# Patient Record
Sex: FEMALE | Race: WHITE | NOT HISPANIC OR LATINO | URBAN - METROPOLITAN AREA
[De-identification: names, ages, dates, MRNs, and addresses within clinical notes are randomized per-mention and may not be internally consistent; named-entity substitution may affect disease eponyms.]

---

## 2022-10-19 ENCOUNTER — OFFICE (OUTPATIENT)
Dept: URBAN - METROPOLITAN AREA CLINIC 19 | Facility: CLINIC | Age: 52
End: 2022-10-19

## 2022-10-19 VITALS
HEIGHT: 62 IN | HEART RATE: 95 BPM | WEIGHT: 179.2 LBS | OXYGEN SATURATION: 98 % | DIASTOLIC BLOOD PRESSURE: 79 MMHG | SYSTOLIC BLOOD PRESSURE: 132 MMHG

## 2022-10-19 DIAGNOSIS — K59.00 CONSTIPATION, UNSPECIFIED: ICD-10-CM

## 2022-10-19 DIAGNOSIS — K21.9 GASTRO-ESOPHAGEAL REFLUX DISEASE WITHOUT ESOPHAGITIS: ICD-10-CM

## 2022-10-19 DIAGNOSIS — E88.81 METABOLIC SYNDROME AND OTHER INSULIN RESISTANCE: ICD-10-CM

## 2022-10-19 DIAGNOSIS — R73.09 OTHER ABNORMAL GLUCOSE: ICD-10-CM

## 2022-10-19 LAB
AMYLASE: 38 U/L (ref 31–110)
HEMOGLOBIN A1C: 5.6 % (ref 4.8–5.6)
LIPASE: 33 U/L (ref 14–72)
LIPID PANEL: CHOLESTEROL, TOTAL: 256 MG/DL — HIGH (ref 100–199)
LIPID PANEL: COMMENT: (no result)
LIPID PANEL: HDL CHOLESTEROL: 82 MG/DL (ref 39–?)
LIPID PANEL: LDL CHOL CALC (NIH): 153 MG/DL — HIGH (ref 0–99)
LIPID PANEL: TRIGLYCERIDES: 123 MG/DL (ref 0–149)
LIPID PANEL: VLDL CHOLESTEROL CAL: 21 MG/DL (ref 5–40)

## 2022-10-19 PROCEDURE — 99204 OFFICE O/P NEW MOD 45 MIN: CPT

## 2022-10-19 RX ORDER — TIRZEPATIDE 2.5 MG/.5ML
INJECTION, SOLUTION SUBCUTANEOUS
Qty: 4 | Refills: 6 | Status: COMPLETED
Start: 2022-10-19 | End: 2023-04-21

## 2022-10-19 RX ORDER — FAMOTIDINE 20 MG/1
TABLET, FILM COATED ORAL
Qty: 60 | Refills: 3 | Status: COMPLETED
Start: 2022-10-19 | End: 2023-04-21

## 2022-10-19 RX ORDER — PROMETHAZINE HYDROCHLORIDE 25 MG/1
TABLET ORAL
Qty: 30 | Refills: 3 | Status: ACTIVE
Start: 2022-10-19

## 2022-10-19 NOTE — SERVICEHPINOTES
Ms. Ren is a 52 year old female here with an elevated A1c, weight gain, GERD, and metabolic syndrome. The patient reports that she has gained weight over the last several years and has been unsuccessful with any diet or exercise program. She is also recently told she had an elevated A1c and placed on metformin. The patient also reports that she does have intermittent GERD for which she takes over-the-counter products. She also reports she does have chronic constipation. She reports that she will go several days with at a bowel movement with associated abdominal discomfort. She denies any melena or hematochezia. She reports that she does exercise on the weekends. She is interested in trying a GLP1. She is currently on stimulants for ADD. She denies a personal history of medullary thyroid carcinoma, pancreatitis, or kidney disease.

## 2022-11-23 ENCOUNTER — TELEHEALTH PROVIDED OTHER THAN IN PATIENT'S HOME (OUTPATIENT)
Dept: URBAN - METROPOLITAN AREA CLINIC 19 | Facility: CLINIC | Age: 52
End: 2022-11-23

## 2022-11-23 VITALS — HEIGHT: 62 IN

## 2022-11-23 DIAGNOSIS — K21.9 GASTRO-ESOPHAGEAL REFLUX DISEASE WITHOUT ESOPHAGITIS: ICD-10-CM

## 2022-11-23 DIAGNOSIS — R14.2 ERUCTATION: ICD-10-CM

## 2022-11-23 DIAGNOSIS — R19.7 DIARRHEA, UNSPECIFIED: ICD-10-CM

## 2022-11-23 DIAGNOSIS — R74.01 ELEVATION OF LEVELS OF LIVER TRANSAMINASE LEVELS: ICD-10-CM

## 2022-11-23 DIAGNOSIS — R10.32 LEFT LOWER QUADRANT PAIN: ICD-10-CM

## 2022-11-23 DIAGNOSIS — K76.0 FATTY (CHANGE OF) LIVER, NOT ELSEWHERE CLASSIFIED: ICD-10-CM

## 2022-11-23 RX ORDER — SIMETHICONE 180 MG
CAPSULE ORAL
Qty: 30 | Refills: 3 | Status: COMPLETED
Start: 2022-11-23 | End: 2023-01-25

## 2022-11-23 NOTE — SERVICENOTES
The duration of the telehealth visit was 7 minutes and 56 seconds. discussed the need to follow-up possible IPMN and to have a fibroscan. Will hold off on GlP1 for now.

## 2022-11-23 NOTE — SERVICEHPINOTES
Ms. Ren is a 52-year-old female here with GERD, recent diarrhea and left lower quadrant pain, elevated liver enzymes, and fatty liver. The patient started Mounjaro in October of this year for metabolic syndrome. She reports that she took 3 doses. She states that she began having diarrhea as well as left lower quadrant pain well after starting the medication.. Patient was seen at an urgent care where she did have a CT scan performed that did revealed suspected colitis of the sigmoid colon. She was placed on Flagyl and Cipro. She did call the office and stated that she was having continued symptoms after antibiotics. An additional prescription of Cipro was called in for the patient. She reports that since finishing that regimen her diarrhea and left lower quadrant pain have dissipated. The CT performed November 1st at urgent care did also reveal patient had fatty liver disease. A CT performed in 2015 also revealed liver steatosis as well as a stable cystic lesion involving the proximal pancreatic body measuring approximately 7 millimeters. This was thought to be a dilated pancreatic duct side branch or a possible side-branch IPMN. This was not appreciated on the most recent CT although no contrast was used. She denies any melena, hematochezia, or vomiting. She states that she is having increased reflux as well as belching, particularly when lying down.

## 2022-11-29 LAB
HEPATIC FUNCTION PANEL (7): ALBUMIN: 5.1 G/DL — HIGH (ref 3.8–4.9)
HEPATIC FUNCTION PANEL (7): ALKALINE PHOSPHATASE: 158 IU/L — HIGH (ref 44–121)
HEPATIC FUNCTION PANEL (7): ALT (SGPT): 21 IU/L (ref 0–32)
HEPATIC FUNCTION PANEL (7): AST (SGOT): 19 IU/L (ref 0–40)
HEPATIC FUNCTION PANEL (7): BILIRUBIN, DIRECT: <0.1 MG/DL
HEPATIC FUNCTION PANEL (7): BILIRUBIN, TOTAL: <0.2 MG/DL
HEPATIC FUNCTION PANEL (7): PROTEIN, TOTAL: 7 G/DL (ref 6–8.5)

## 2022-11-30 ENCOUNTER — OFFICE (OUTPATIENT)
Dept: URBAN - METROPOLITAN AREA CLINIC 14 | Facility: CLINIC | Age: 52
End: 2022-11-30
Payer: COMMERCIAL

## 2022-11-30 VITALS — HEIGHT: 62 IN

## 2022-11-30 DIAGNOSIS — R74.01 ELEVATION OF LEVELS OF LIVER TRANSAMINASE LEVELS: ICD-10-CM

## 2022-11-30 PROCEDURE — 91200 LIVER ELASTOGRAPHY: CPT

## 2023-01-25 ENCOUNTER — OFFICE (OUTPATIENT)
Dept: URBAN - METROPOLITAN AREA CLINIC 19 | Facility: CLINIC | Age: 53
End: 2023-01-25

## 2023-01-25 VITALS — HEIGHT: 62 IN

## 2023-01-25 DIAGNOSIS — E88.81 METABOLIC SYNDROME AND OTHER INSULIN RESISTANCE: ICD-10-CM

## 2023-01-25 DIAGNOSIS — E11.9 TYPE 2 DIABETES MELLITUS WITHOUT COMPLICATIONS: ICD-10-CM

## 2023-01-25 DIAGNOSIS — R74.01 ELEVATION OF LEVELS OF LIVER TRANSAMINASE LEVELS: ICD-10-CM

## 2023-01-25 DIAGNOSIS — K21.9 GASTRO-ESOPHAGEAL REFLUX DISEASE WITHOUT ESOPHAGITIS: ICD-10-CM

## 2023-01-25 DIAGNOSIS — K76.0 FATTY (CHANGE OF) LIVER, NOT ELSEWHERE CLASSIFIED: ICD-10-CM

## 2023-01-25 RX ORDER — TIRZEPATIDE 2.5 MG/.5ML
INJECTION, SOLUTION SUBCUTANEOUS
Qty: 4 | Refills: 6 | Status: COMPLETED
Start: 2022-10-19 | End: 2023-04-21

## 2023-01-28 LAB
AMYLASE: 46 U/L (ref 31–110)
HEPATIC FUNCTION PANEL (7): ALBUMIN: 4.8 G/DL (ref 3.8–4.9)
HEPATIC FUNCTION PANEL (7): ALKALINE PHOSPHATASE: 168 IU/L — HIGH (ref 44–121)
HEPATIC FUNCTION PANEL (7): ALT (SGPT): 18 IU/L (ref 0–32)
HEPATIC FUNCTION PANEL (7): AST (SGOT): 16 IU/L (ref 0–40)
HEPATIC FUNCTION PANEL (7): BILIRUBIN, DIRECT: <0.1 MG/DL
HEPATIC FUNCTION PANEL (7): BILIRUBIN, TOTAL: 0.3 MG/DL (ref 0–1.2)
HEPATIC FUNCTION PANEL (7): PROTEIN, TOTAL: 7.4 G/DL (ref 6–8.5)
LIPASE: 32 U/L (ref 14–72)

## 2023-04-21 ENCOUNTER — OFFICE (OUTPATIENT)
Dept: URBAN - METROPOLITAN AREA CLINIC 10 | Facility: CLINIC | Age: 53
End: 2023-04-21

## 2023-04-21 VITALS
WEIGHT: 173 LBS | OXYGEN SATURATION: 98 % | SYSTOLIC BLOOD PRESSURE: 121 MMHG | DIASTOLIC BLOOD PRESSURE: 77 MMHG | HEIGHT: 62 IN | HEART RATE: 87 BPM

## 2023-04-21 DIAGNOSIS — R74.8 ABNORMAL LEVELS OF OTHER SERUM ENZYMES: ICD-10-CM

## 2023-04-21 DIAGNOSIS — R19.7 DIARRHEA, UNSPECIFIED: ICD-10-CM

## 2023-04-21 DIAGNOSIS — Z83.71 FAMILY HISTORY OF COLONIC POLYPS: ICD-10-CM

## 2023-04-21 DIAGNOSIS — K76.0 FATTY (CHANGE OF) LIVER, NOT ELSEWHERE CLASSIFIED: ICD-10-CM

## 2023-04-21 DIAGNOSIS — E88.81 METABOLIC SYNDROME AND OTHER INSULIN RESISTANCE: ICD-10-CM

## 2023-04-21 DIAGNOSIS — K21.9 GASTRO-ESOPHAGEAL REFLUX DISEASE WITHOUT ESOPHAGITIS: ICD-10-CM

## 2023-04-21 PROCEDURE — 99214 OFFICE O/P EST MOD 30 MIN: CPT | Performed by: PHYSICIAN ASSISTANT

## 2023-04-21 RX ORDER — HYOSCYAMINE SULFATE 0.12 MG/1
TABLET ORAL; SUBLINGUAL
Qty: 60 | Refills: 5 | Status: ACTIVE
Start: 2023-04-21

## 2023-04-21 RX ORDER — AZITHROMYCIN 500 MG/1
TABLET, FILM COATED ORAL
Qty: 2 | Refills: 0 | Status: ACTIVE
Start: 2023-04-21

## 2023-04-21 NOTE — SERVICEHPINOTES
Charo Ren   is a  52   year old   female   who is here today for a follow-up visit. She was last seen here on  4/21/2023   for a  Follow Up  .    
mercedez Gudino was 1st seen by Dr. Murray in October of 2015 with issues such as nausea, vomiting, and constipation. She had noted symptom improvement taking Linzess.Celiac screening was negative in 2015. She did have positive H pylori breath test- was treated with medication.She had triple phase CT with indication of chronic pancreatitis and November of 2015 which showed no CT evidence of chronic pancreatitis but did show cystic lesion involving the proximal pancreatic body similar in appearance since last exam which may reflect dilated side duct versus IPMN. Diffuse hepatic steatosis reported. Hemorrhagic left renal cysts also noted.She had CT of the abdomen and pelvis noncontrast in November of 2022 with indication of left lower quadrant pain and persistent diarrhea. This showed mild wall thickening in distal rectum and sigmoid suspicious for colitis. Hepatic steatosis reported. There was no significant abnormality identified in the unenhanced pancreas.Molecular stool panel end of last year was all negative.Hepatic function panel at the end of last year showed AST 19/ALT 21//total bili 0.2/albumin 5.1.She had a FibroScan in November of last year also which showed a CT AP score of 310 suggesting steatosis, KPA was 5.0 which was not consistent with any significant fibrosis.Liver panel and January of this year showed normal transaminases with ALP again mildly elevated at 168. Amylase and lipase were normal.Finally, she had some labs in February of this year which showed ALP was 191. Iron panel was all within normal limits. Anti smooth muscle antibody negative. Anti mitochondrial antibody was negative. Hep C antibody was negative. Ferritin was 37. Hep B surface antigen negative and antinuclear antibody was negative.She has been on Mounjaro 2.5mg/0.5mL SQ injections once weekly for weight loss and borderline diabetes. She was switched to Ozempic. Weight 10/2022 was 179, 173 today. This is my first time seeing the pt. She was lastly seeing Gertrudis Diane NP who has left our group and pt wanted to switch due to this and due to location. 
mercedez newsome   She reports Tuesday evening she had some "extremely bad diarrhea". She has not had any nausea or vomiting. Abdomen was crampy beginning on Tuesday. No recent abx. No other sick contacts in household, no others sick thus far. She has had about 6-7 BMs that are watery. This is new. She feels this may be due to GI bug. 
br
mercedez She reports "acidic gas buildup" and belching after Ozempic injection. She has that usually after every administration but largely says it has gotten better. She is on PPI daily. mercedez newsome She reports she had colonoscopy about 5 years ago. Denies polyps.

## 2023-04-23 LAB
ALK PHOS ISOENZYME: BONE FRACTION: 24 % (ref 14–68)
ALK PHOS ISOENZYME: INTESTINAL FRAC.: 9 % (ref 0–18)
ALK PHOS ISOENZYME: LIVER FRACTION: 67 % (ref 18–85)
CBC WITH DIFFERENTIAL/PLATELET: BASO (ABSOLUTE): 0.1 X10E3/UL (ref 0–0.2)
CBC WITH DIFFERENTIAL/PLATELET: BASOS: 1 %
CBC WITH DIFFERENTIAL/PLATELET: EOS (ABSOLUTE): 0.1 X10E3/UL (ref 0–0.4)
CBC WITH DIFFERENTIAL/PLATELET: EOS: 1 %
CBC WITH DIFFERENTIAL/PLATELET: HEMATOCRIT: 41.7 % (ref 34–46.6)
CBC WITH DIFFERENTIAL/PLATELET: HEMOGLOBIN: 14 G/DL (ref 11.1–15.9)
CBC WITH DIFFERENTIAL/PLATELET: IMMATURE GRANS (ABS): 0 X10E3/UL (ref 0–0.1)
CBC WITH DIFFERENTIAL/PLATELET: IMMATURE GRANULOCYTES: 0 %
CBC WITH DIFFERENTIAL/PLATELET: LYMPHS (ABSOLUTE): 1.7 X10E3/UL (ref 0.7–3.1)
CBC WITH DIFFERENTIAL/PLATELET: LYMPHS: 31 %
CBC WITH DIFFERENTIAL/PLATELET: MCH: 29.8 PG (ref 26.6–33)
CBC WITH DIFFERENTIAL/PLATELET: MCHC: 33.6 G/DL (ref 31.5–35.7)
CBC WITH DIFFERENTIAL/PLATELET: MCV: 89 FL (ref 79–97)
CBC WITH DIFFERENTIAL/PLATELET: MONOCYTES(ABSOLUTE): 0.5 X10E3/UL (ref 0.1–0.9)
CBC WITH DIFFERENTIAL/PLATELET: MONOCYTES: 9 %
CBC WITH DIFFERENTIAL/PLATELET: NEUTROPHILS (ABSOLUTE): 3.2 X10E3/UL (ref 1.4–7)
CBC WITH DIFFERENTIAL/PLATELET: NEUTROPHILS: 58 %
CBC WITH DIFFERENTIAL/PLATELET: PLATELETS: 298 X10E3/UL (ref 150–450)
CBC WITH DIFFERENTIAL/PLATELET: RBC: 4.7 X10E6/UL (ref 3.77–5.28)
CBC WITH DIFFERENTIAL/PLATELET: RDW: 12.9 % (ref 11.7–15.4)
CBC WITH DIFFERENTIAL/PLATELET: WBC: 5.4 X10E3/UL (ref 3.4–10.8)
COMP. METABOLIC PANEL (14): A/G RATIO: 1.8 (ref 1.2–2.2)
COMP. METABOLIC PANEL (14): ALBUMIN: 4.5 G/DL (ref 3.8–4.9)
COMP. METABOLIC PANEL (14): ALKALINE PHOSPHATASE: 154 IU/L — HIGH (ref 44–121)
COMP. METABOLIC PANEL (14): ALT (SGPT): 19 IU/L (ref 0–32)
COMP. METABOLIC PANEL (14): AST (SGOT): 14 IU/L (ref 0–40)
COMP. METABOLIC PANEL (14): BILIRUBIN, TOTAL: 0.3 MG/DL (ref 0–1.2)
COMP. METABOLIC PANEL (14): BUN/CREATININE RATIO: 21 (ref 9–23)
COMP. METABOLIC PANEL (14): BUN: 18 MG/DL (ref 6–24)
COMP. METABOLIC PANEL (14): CALCIUM: 9.3 MG/DL (ref 8.7–10.2)
COMP. METABOLIC PANEL (14): CARBON DIOXIDE, TOTAL: 27 MMOL/L (ref 20–29)
COMP. METABOLIC PANEL (14): CHLORIDE: 101 MMOL/L (ref 96–106)
COMP. METABOLIC PANEL (14): CREATININE: 0.86 MG/DL (ref 0.57–1)
COMP. METABOLIC PANEL (14): EGFR: 80 ML/MIN/1.73 (ref 59–?)
COMP. METABOLIC PANEL (14): GLOBULIN, TOTAL: 2.5 G/DL (ref 1.5–4.5)
COMP. METABOLIC PANEL (14): GLUCOSE: 85 MG/DL (ref 70–99)
COMP. METABOLIC PANEL (14): POTASSIUM: 4 MMOL/L (ref 3.5–5.2)
COMP. METABOLIC PANEL (14): PROTEIN, TOTAL: 7 G/DL (ref 6–8.5)
COMP. METABOLIC PANEL (14): SODIUM: 140 MMOL/L (ref 134–144)
GGT: 16 IU/L (ref 0–60)
HGB A1C WITH EAG ESTIMATION: ESTIM. AVG GLU (EAG): 114 MG/DL
HGB A1C WITH EAG ESTIMATION: HEMOGLOBIN A1C: 5.6 % (ref 4.8–5.6)

## 2023-06-02 ENCOUNTER — OFFICE (OUTPATIENT)
Dept: URBAN - METROPOLITAN AREA CLINIC 22 | Facility: CLINIC | Age: 53
End: 2023-06-02

## 2023-06-02 DIAGNOSIS — K86.9 DISEASE OF PANCREAS, UNSPECIFIED: ICD-10-CM

## 2023-06-02 PROCEDURE — 74178 CT ABD&PLV WO CNTR FLWD CNTR: CPT | Mod: TC | Performed by: INTERNAL MEDICINE
